# Patient Record
Sex: FEMALE | Race: WHITE
[De-identification: names, ages, dates, MRNs, and addresses within clinical notes are randomized per-mention and may not be internally consistent; named-entity substitution may affect disease eponyms.]

---

## 2020-07-16 PROBLEM — Z00.00 ENCOUNTER FOR PREVENTIVE HEALTH EXAMINATION: Status: ACTIVE | Noted: 2020-07-16

## 2020-08-05 ENCOUNTER — APPOINTMENT (OUTPATIENT)
Dept: OTOLARYNGOLOGY | Facility: CLINIC | Age: 30
End: 2020-08-05
Payer: COMMERCIAL

## 2020-08-05 VITALS
HEIGHT: 60 IN | HEART RATE: 73 BPM | WEIGHT: 137 LBS | DIASTOLIC BLOOD PRESSURE: 68 MMHG | BODY MASS INDEX: 26.9 KG/M2 | TEMPERATURE: 98.3 F | SYSTOLIC BLOOD PRESSURE: 100 MMHG

## 2020-08-05 DIAGNOSIS — H60.90 UNSPECIFIED OTITIS EXTERNA, UNSPECIFIED EAR: ICD-10-CM

## 2020-08-05 DIAGNOSIS — H92.03 OTALGIA, BILATERAL: ICD-10-CM

## 2020-08-05 PROCEDURE — 99204 OFFICE O/P NEW MOD 45 MIN: CPT

## 2020-08-05 RX ORDER — METHYLPREDNISOLONE 4 MG/1
4 TABLET ORAL
Qty: 21 | Refills: 0 | Status: ACTIVE | COMMUNITY
Start: 2020-08-05 | End: 1900-01-01

## 2020-08-05 NOTE — HISTORY OF PRESENT ILLNESS
[Hearing Loss] : hearing loss [No] : patient does not have a  history of radiation therapy [de-identified] : 29 yo female\par Patient states July 13 she had a severe right sided ear infection was seen in the ED, was put on drops and Augmentin. Is now having left severe ear pain. Was placing drops that she had at home, with no relief. Taking Ibuprofen which relieves the pain. Has mild ear d/c. Hearing the left is muffled.  Denies fever, chills. \par Pt has no tinnitus, vertigo, nasal congestion, nasal discharge, epistaxis, sinus infections, facial pain, facial pressure, throat pain, dysphagia or fevers\par \par  [Dizziness] : no dizziness [Anxiety] : no anxiety [Headache] : no headache [Recurrent Otitis Media] : no recurrent otitis media [Otitis Media with Effusion] : no otitis media with effusion [Presbycusis] : no presbycusis [Congenital Ear Malformation] : no congenital ear malformation [Meniere Disease] : no Meniere disease [Hypertension] : no hypertension [Perilymphatic Fistula] : no perilymphatic fistula [Otosclerosis] : no otosclerosis [Loud Noise Exposure] : no history of loud noise exposure [Smoking] : no smoking [Early Onset Hearing Loss] : no early onset hearing loss [Stroke] : no stroke [Ear Fullness] : no ear fullness [Diplopia] : no diplopia [Allergic Rhinitis] : no allergic rhinitis [Maxillary Tooth Infection] : no maxillary tooth infection [Septal Deviation] : no septal deviation [Environmental Allergies] : no environmental allergies [Seasonal Allergies] : no seasonal allergies [Environmental Allergens] : no environmental allergens [Nasal FB Removal] : no nasal foreign body removal [Adenoidectomy] : no adenoidectomy [Asthma] : no asthma [Allergies] : no allergies [Neck Mass] : no neck mass [Neck Pain] : no neck pain [Cold Intolerance] : no cold intolerance [Chills] : no chills [Fatigue] : no fatigue [Heat Intolerance] : no heat intolerance [Cough] : no cough [Hyperthyroidism] : no hyperthyroidism [Sialadenitis] : no sialadenitis [Tobacco Use] : no tobacco use [Hodgkin Disease] : no hodgkin disease [Non-Hodgkin Lymphoma] : no non-hodgkin lymphoma [Alcohol Use] : no alcohol use [Graves Disease] : no graves disease [Thyroid Cancer] : no thyroid cancer

## 2020-08-05 NOTE — PHYSICAL EXAM
[Midline] : trachea located in midline position [Normal] : no rashes [de-identified] : left erythema

## 2020-08-05 NOTE — ASSESSMENT
[FreeTextEntry1] : Left OE:\par -Rx: medrol dose pack \par -Rx: Vosol ear drops\par -H2O precautions \par d/c q-tips\par rx-Debrox prn wax\par \par f/u 10 days or prn \par